# Patient Record
Sex: FEMALE | Race: WHITE
[De-identification: names, ages, dates, MRNs, and addresses within clinical notes are randomized per-mention and may not be internally consistent; named-entity substitution may affect disease eponyms.]

---

## 2020-09-06 ENCOUNTER — HOSPITAL ENCOUNTER (EMERGENCY)
Dept: HOSPITAL 8 - ED | Age: 35
LOS: 1 days | Discharge: PSYCHIATRIC OR SUBSTANCE USE FACILITY | End: 2020-09-07
Payer: MEDICAID

## 2020-09-06 VITALS — BODY MASS INDEX: 29.76 KG/M2 | WEIGHT: 185.19 LBS | HEIGHT: 66 IN

## 2020-09-06 DIAGNOSIS — F41.9: ICD-10-CM
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
## 2020-09-06 DIAGNOSIS — S61.511A: ICD-10-CM
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
## 2020-09-06 DIAGNOSIS — F32.9: ICD-10-CM

## 2020-09-06 DIAGNOSIS — T14.91XA: Primary | ICD-10-CM
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
## 2020-09-06 LAB
ALBUMIN SERPL-MCNC: 3.6 G/DL (ref 3.4–5)
ALP SERPL-CCNC: 67 U/L (ref 45–117)
ALT SERPL-CCNC: 116 U/L (ref 12–78)
ANION GAP SERPL CALC-SCNC: 7 MMOL/L (ref 5–15)
BASOPHILS # BLD AUTO: 0.03 X10^3/UL (ref 0–0.1)
BASOPHILS NFR BLD AUTO: 0 % (ref 0–1)
BILIRUB SERPL-MCNC: 0.2 MG/DL (ref 0.2–1)
CALCIUM SERPL-MCNC: 7.8 MG/DL (ref 8.5–10.1)
CHLORIDE SERPL-SCNC: 115 MMOL/L (ref 98–107)
CREAT SERPL-MCNC: 0.81 MG/DL (ref 0.55–1.02)
EOSINOPHIL # BLD AUTO: 0.12 X10^3/UL (ref 0–0.4)
EOSINOPHIL NFR BLD AUTO: 1 % (ref 1–7)
ERYTHROCYTE [DISTWIDTH] IN BLOOD BY AUTOMATED COUNT: 13 % (ref 9.6–15.2)
HCG UR SG: 1.01 (ref 1–1.03)
LYMPHOCYTES # BLD AUTO: 4.6 X10^3/UL (ref 1–3.4)
LYMPHOCYTES NFR BLD AUTO: 53 % (ref 22–44)
MCH RBC QN AUTO: 32.5 PG (ref 27–34.8)
MCHC RBC AUTO-ENTMCNC: 33.7 G/DL (ref 32.4–35.8)
MCV RBC AUTO: 96.3 FL (ref 80–100)
MD: NO
MONOCYTES # BLD AUTO: 0.68 X10^3/UL (ref 0.2–0.8)
MONOCYTES NFR BLD AUTO: 8 % (ref 2–9)
NEUTROPHILS # BLD AUTO: 3.32 X10^3/UL (ref 1.8–6.8)
NEUTROPHILS NFR BLD AUTO: 38 % (ref 42–75)
PLATELET # BLD AUTO: 327 X10^3/UL (ref 130–400)
PMV BLD AUTO: 7.3 FL (ref 7.4–10.4)
PROT SERPL-MCNC: 7.2 G/DL (ref 6.4–8.2)
RBC # BLD AUTO: 4.2 X10^6/UL (ref 3.82–5.3)
VANCOMYCIN TROUGH SERPL-MCNC: < 1.7 MG/DL (ref 2.8–20)

## 2020-09-06 PROCEDURE — 85025 COMPLETE CBC W/AUTO DIFF WBC: CPT

## 2020-09-06 PROCEDURE — 90715 TDAP VACCINE 7 YRS/> IM: CPT

## 2020-09-06 PROCEDURE — 81025 URINE PREGNANCY TEST: CPT

## 2020-09-06 PROCEDURE — 12042 INTMD RPR N-HF/GENIT2.6-7.5: CPT

## 2020-09-06 PROCEDURE — 90471 IMMUNIZATION ADMIN: CPT

## 2020-09-06 PROCEDURE — 80053 COMPREHEN METABOLIC PANEL: CPT

## 2020-09-06 PROCEDURE — 80307 DRUG TEST PRSMV CHEM ANLYZR: CPT

## 2020-09-06 PROCEDURE — 84703 CHORIONIC GONADOTROPIN ASSAY: CPT

## 2020-09-06 PROCEDURE — 36415 COLL VENOUS BLD VENIPUNCTURE: CPT

## 2020-09-06 PROCEDURE — 99285 EMERGENCY DEPT VISIT HI MDM: CPT

## 2020-09-06 NOTE — NUR
LACERATION REPAIR IN PROCESS. FAMILY IN WAITING ROOM AWAITING VISITATION. 
PATIENT IN NO APPARENT DISTRESS AT THIS TIME.

## 2020-09-06 NOTE — NUR
francisco rn:Pt presents to ed c/o SA tonight. Cut wrist "to end it all.. Im kind 
of done with everything." States multiple sa attempts, including recent wrist 
cutting to Other, Left, wrist, and I ate 30 ambien and still woke up the next 
day. States being seen at [facility] for multiple psychiatric disorders and on 
medications at home. Roller doors down. Ua sample collected. Sitter in hallway. 
All belongings in 1 bag and put in locker. Main rn given report.

## 2020-09-07 VITALS — SYSTOLIC BLOOD PRESSURE: 107 MMHG | DIASTOLIC BLOOD PRESSURE: 68 MMHG

## 2020-09-07 NOTE — NUR
Patient is hyperventilating. Mom at bedside. patient placed on PaO2 monitor. 
VSS. Patient was reassured of plan of care.

## 2020-09-07 NOTE — NUR
[facility] CALLED FOR UPDATED VITALS ON PT.  VITALS TAKEN AND REPORTED TO 
[facility].  STATED THEY WILL RELAY THEM TO THEIR DOC AND SEE IF DOC WILL 
ACCEPT PT.  PT RESTING CALMLY IN BED AT THIS TIME.  SITTER AT DOOR.

## 2020-09-07 NOTE — NUR
patient resting in bed with family at bedside. She states her wrist is becoming 
painful.  She is requesting pain medication.

## 2020-09-07 NOTE — NUR
pt medicated for pain in right wrist.  pt resting in bed with eyes closed.  no 
other stated needs at this time.

## 2020-09-07 NOTE — NUR
PT GIVEN MEAL TRAY.  STATED HER LEFT WRIST HURTS.  ERP AWARE, MEDS ORDERED.  
SITTER AT DOOR.  PT MOTHER REQUESTING PT CALL HER TODAY.  PT AWARE.  PT 
INSTRUCTED ON PHONE USE.

## 2020-09-07 NOTE — NUR
called [facility] as they have an accepting doctor.  Spoke with Jonn who 
stated they received report on pt last night and did not need report from this 
RN at this time.  Will continue to monitor.

## 2020-11-18 ENCOUNTER — HOSPITAL ENCOUNTER (EMERGENCY)
Dept: HOSPITAL 8 - ED | Age: 35
LOS: 1 days | Discharge: HOME | End: 2020-11-19
Payer: MEDICAID

## 2020-11-18 VITALS — HEIGHT: 67 IN | BODY MASS INDEX: 32.21 KG/M2 | WEIGHT: 205.25 LBS

## 2020-11-18 DIAGNOSIS — S62.306A: Primary | ICD-10-CM
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
## 2020-11-18 PROCEDURE — 29125 APPL SHORT ARM SPLINT STATIC: CPT

## 2020-11-18 PROCEDURE — 99284 EMERGENCY DEPT VISIT MOD MDM: CPT

## 2020-11-19 VITALS — SYSTOLIC BLOOD PRESSURE: 133 MMHG | DIASTOLIC BLOOD PRESSURE: 82 MMHG

## 2020-11-19 NOTE — NUR
SPLINT PLACED. PT MEDICATED FOR PAIN. VSS. PT VERBALIZED UNDERSTANDING OF 
DISCHARGE AND FOLLOW UP INSTRUCTIONS. PT AMBULATED OUT OF ER WITH A STEADY 
GAIT.

## 2020-12-21 ENCOUNTER — HOSPITAL ENCOUNTER (EMERGENCY)
Dept: HOSPITAL 8 - ED | Age: 35
Discharge: HOME | End: 2020-12-21
Payer: MEDICAID

## 2020-12-21 VITALS — BODY MASS INDEX: 33.69 KG/M2 | WEIGHT: 209.66 LBS | HEIGHT: 66 IN

## 2020-12-21 VITALS — SYSTOLIC BLOOD PRESSURE: 120 MMHG | DIASTOLIC BLOOD PRESSURE: 60 MMHG
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
## 2020-12-21 DIAGNOSIS — S61.215A: Primary | ICD-10-CM
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
## 2020-12-21 DIAGNOSIS — S61.217A: ICD-10-CM

## 2020-12-21 PROCEDURE — 12041 INTMD RPR N-HF/GENIT 2.5CM/<: CPT

## 2020-12-21 PROCEDURE — 99285 EMERGENCY DEPT VISIT HI MDM: CPT

## 2020-12-21 NOTE — NUR
DISCHARGE INSTRUCTIONS EXPLAINED TO PT. PT VERBALIZED UNDERSTANDING. RX GIVEN 
TO PT. LEFT FINGERS CLEANED, AND BANDAGED. PT AMBULATED TO DISCHARGE DESK 
WITHOUT INCIDENT

## 2021-05-10 ENCOUNTER — HOSPITAL ENCOUNTER (OUTPATIENT)
Dept: HOSPITAL 8 - OUT | Age: 36
Discharge: HOME | End: 2021-05-10
Attending: SPECIALIST
Payer: MEDICAID

## 2021-05-10 VITALS — WEIGHT: 201.5 LBS | HEIGHT: 66 IN | BODY MASS INDEX: 32.38 KG/M2

## 2021-05-10 VITALS — DIASTOLIC BLOOD PRESSURE: 75 MMHG | SYSTOLIC BLOOD PRESSURE: 111 MMHG

## 2021-05-10 DIAGNOSIS — N80.2: ICD-10-CM

## 2021-05-10 DIAGNOSIS — N81.6: ICD-10-CM

## 2021-05-10 DIAGNOSIS — N94.6: ICD-10-CM

## 2021-05-10 DIAGNOSIS — Z88.5: ICD-10-CM

## 2021-05-10 DIAGNOSIS — N81.5: ICD-10-CM

## 2021-05-10 DIAGNOSIS — Z20.822: ICD-10-CM

## 2021-05-10 DIAGNOSIS — N73.6: ICD-10-CM

## 2021-05-10 DIAGNOSIS — N81.11: ICD-10-CM

## 2021-05-10 DIAGNOSIS — N94.10: ICD-10-CM

## 2021-05-10 DIAGNOSIS — N92.1: Primary | ICD-10-CM

## 2021-05-10 DIAGNOSIS — Z98.890: ICD-10-CM

## 2021-05-10 DIAGNOSIS — N81.89: ICD-10-CM

## 2021-05-10 LAB — HCG UR SG: 1.02 (ref 1–1.03)

## 2021-05-10 PROCEDURE — 88307 TISSUE EXAM BY PATHOLOGIST: CPT

## 2021-05-10 PROCEDURE — 57282 COLPOPEXY EXTRAPERITONEAL: CPT

## 2021-05-10 PROCEDURE — 58552 LAPARO-VAG HYST INCL T/O: CPT

## 2021-05-10 PROCEDURE — 81025 URINE PREGNANCY TEST: CPT

## 2021-05-10 PROCEDURE — 87635 SARS-COV-2 COVID-19 AMP PRB: CPT

## 2021-05-10 PROCEDURE — 57265 CMBN AP COLPRHY W/NTRCL RPR: CPT

## 2021-05-10 RX ADMIN — HYDROMORPHONE HYDROCHLORIDE PRN MG: 1 INJECTION, SOLUTION INTRAMUSCULAR; INTRAVENOUS; SUBCUTANEOUS at 11:42

## 2021-05-10 RX ADMIN — FENTANYL CITRATE PRN MCG: 50 INJECTION INTRAMUSCULAR; INTRAVENOUS at 11:27

## 2021-05-10 RX ADMIN — HYDROMORPHONE HYDROCHLORIDE PRN MG: 1 INJECTION, SOLUTION INTRAMUSCULAR; INTRAVENOUS; SUBCUTANEOUS at 11:34

## 2021-05-10 RX ADMIN — HYDROMORPHONE HYDROCHLORIDE PRN MG: 1 INJECTION, SOLUTION INTRAMUSCULAR; INTRAVENOUS; SUBCUTANEOUS at 12:05

## 2021-05-10 RX ADMIN — FENTANYL CITRATE PRN MCG: 50 INJECTION INTRAMUSCULAR; INTRAVENOUS at 11:22
